# Patient Record
Sex: MALE | Race: BLACK OR AFRICAN AMERICAN | URBAN - METROPOLITAN AREA
[De-identification: names, ages, dates, MRNs, and addresses within clinical notes are randomized per-mention and may not be internally consistent; named-entity substitution may affect disease eponyms.]

---

## 2023-04-03 ENCOUNTER — APPOINTMENT (OUTPATIENT)
Dept: GENERAL RADIOLOGY | Age: 53
End: 2023-04-03
Attending: STUDENT IN AN ORGANIZED HEALTH CARE EDUCATION/TRAINING PROGRAM

## 2023-04-03 ENCOUNTER — HOSPITAL ENCOUNTER (EMERGENCY)
Age: 53
Discharge: LEFT BEFORE DISCHARGE | End: 2023-04-03
Attending: STUDENT IN AN ORGANIZED HEALTH CARE EDUCATION/TRAINING PROGRAM

## 2023-04-03 DIAGNOSIS — F14.90 COCAINE USE: ICD-10-CM

## 2023-04-03 DIAGNOSIS — R07.9 CHEST PAIN, UNSPECIFIED TYPE: Primary | ICD-10-CM

## 2023-04-03 PROCEDURE — 99283 EMERGENCY DEPT VISIT LOW MDM: CPT

## 2023-04-03 PROCEDURE — 71045 X-RAY EXAM CHEST 1 VIEW: CPT

## 2023-04-03 NOTE — ED TRIAGE NOTES
Pt arrives to ED for chest pain after smoking heroin. Describes pain as non-radiating and \" like gas pain\". Pt states clean for 1 year prior to tonight.

## 2023-04-04 NOTE — ED PROVIDER NOTES
60-year-old male with history of cocaine abuse presents to the ED with chief complaint of chest pain starting today. Patient says that for the past month he has relapsed on cocaine after being clean for a year. He smokes, does not snort. Denies any alcohol or other drug use. Denies any associated shortness of breath, fevers, chills, cough, Moises pain, urinary symptoms, bowel symptoms, nausea, vomiting. He does endorse anxiety. History limited as patient cut the interview short and wanted to leave the ED. The history is provided by the patient. Chest Pain (Angina)   Pertinent negatives include no shortness of breath. No past medical history on file. No past surgical history on file. No family history on file. Social History     Socioeconomic History    Marital status: Not on file     Spouse name: Not on file    Number of children: Not on file    Years of education: Not on file    Highest education level: Not on file   Occupational History    Not on file   Tobacco Use    Smoking status: Not on file    Smokeless tobacco: Not on file   Substance and Sexual Activity    Alcohol use: Not on file    Drug use: Not on file    Sexual activity: Not on file   Other Topics Concern    Not on file   Social History Narrative    Not on file     Social Determinants of Health     Financial Resource Strain: Not on file   Food Insecurity: Not on file   Transportation Needs: Not on file   Physical Activity: Not on file   Stress: Not on file   Social Connections: Not on file   Intimate Partner Violence: Not on file   Housing Stability: Not on file         ALLERGIES: Cephalosporins and Keflex [cephalexin]    Review of Systems   Respiratory:  Negative for shortness of breath. Cardiovascular:  Positive for chest pain.      Vitals:    04/03/23 2001   BP: (!) 139/100   Pulse: (!) 101   Resp: 16   Temp: 98.4 °F (36.9 °C)   SpO2: 100%   Weight: 10.9 kg (24 lb)   Height: 6' 1\" (1.854 m)            Physical Exam  Constitutional:       General: He is not in acute distress. Appearance: He is well-developed. HENT:      Head: Normocephalic and atraumatic. Eyes:      Conjunctiva/sclera: Conjunctivae normal.      Pupils: Pupils are equal, round, and reactive to light. Neck:      Trachea: No tracheal deviation. Cardiovascular:      Rate and Rhythm: Normal rate and regular rhythm. Heart sounds: No murmur heard. No friction rub. No gallop. Pulmonary:      Effort: No respiratory distress. Breath sounds: Normal breath sounds. Abdominal:      General: Bowel sounds are normal. There is no distension. Palpations: Abdomen is soft. Tenderness: There is no abdominal tenderness. Musculoskeletal:         General: No deformity. Cervical back: Neck supple. Skin:     General: Skin is warm and dry. Neurological:      Mental Status: He is alert and oriented to person, place, and time. Psychiatric:         Mood and Affect: Mood is anxious. Medical Decision Making  55-year-old male presenting to the ED with chest pain in the setting of cocaine use. Borderline tachycardic, otherwise stable vital signs and benign exam.  Chest x-ray obtained but unfortunately patient left AMA prior to any other testing including EKG and did not receive any paperwork outside of a list of drug abuse treatment programs. Patient did convey understanding that he can return to the ED at any point to continue his work-up and conveyed understanding that he could suffer serious health consequences including disability and death from leaving the hospital without investigating the source of his chest pain. Problems Addressed:  Chest pain, unspecified type: acute illness or injury    Amount and/or Complexity of Data Reviewed  Labs: ordered. Radiology: ordered. ECG/medicine tests: ordered.            Procedures    LABORATORY RESULTS:  No results found for this or any previous visit (from the past 24 hour(s)). IMAGING RESULTS:  XR CHEST PORT    Result Date: 4/3/2023  No Acute Disease. MEDICATIONS GIVEN:  Medications - No data to display    IMPRESSION:  1. Chest pain, unspecified type    2. Cocaine use        PLAN:  Follow-up Information    None       There are no discharge medications for this patient.       Signed By: Radha Byrnes MD     April 3, 2023

## 2024-10-18 ENCOUNTER — HOSPITAL ENCOUNTER (INPATIENT)
Facility: HOSPITAL | Age: 54
LOS: 4 days | Discharge: LEFT BEFORE DISCHARGE | DRG: 751 | End: 2024-10-22
Attending: PSYCHIATRY & NEUROLOGY | Admitting: PSYCHIATRY & NEUROLOGY
Payer: MEDICAID

## 2024-10-18 ENCOUNTER — HOSPITAL ENCOUNTER (EMERGENCY)
Facility: HOSPITAL | Age: 54
Discharge: ANOTHER HOSPITAL | End: 2024-10-18
Attending: EMERGENCY MEDICINE
Payer: MEDICAID

## 2024-10-18 VITALS
RESPIRATION RATE: 16 BRPM | SYSTOLIC BLOOD PRESSURE: 111 MMHG | WEIGHT: 230 LBS | OXYGEN SATURATION: 96 % | TEMPERATURE: 98.4 F | HEIGHT: 73 IN | HEART RATE: 88 BPM | DIASTOLIC BLOOD PRESSURE: 68 MMHG | BODY MASS INDEX: 30.48 KG/M2

## 2024-10-18 DIAGNOSIS — R45.851 SUICIDAL IDEATION: Primary | ICD-10-CM

## 2024-10-18 PROBLEM — F39 UNSPECIFIED MOOD (AFFECTIVE) DISORDER (HCC): Status: ACTIVE | Noted: 2024-10-18

## 2024-10-18 PROBLEM — F33.0 MDD (MAJOR DEPRESSIVE DISORDER), RECURRENT EPISODE, MILD (HCC): Status: ACTIVE | Noted: 2024-10-18

## 2024-10-18 LAB
AMPHET UR QL SCN: NEGATIVE
ANION GAP SERPL CALC-SCNC: 4 MMOL/L (ref 2–12)
APPEARANCE UR: ABNORMAL
BACTERIA URNS QL MICRO: NEGATIVE /HPF
BARBITURATES UR QL SCN: NEGATIVE
BASOPHILS # BLD: 0 K/UL (ref 0–0.1)
BASOPHILS NFR BLD: 1 % (ref 0–1)
BENZODIAZ UR QL: NEGATIVE
BILIRUB UR QL: NEGATIVE
BUN SERPL-MCNC: 27 MG/DL (ref 6–20)
BUN/CREAT SERPL: 14 (ref 12–20)
CA-I BLD-MCNC: 9.3 MG/DL (ref 8.5–10.1)
CANNABINOIDS UR QL SCN: POSITIVE
CHLORIDE SERPL-SCNC: 108 MMOL/L (ref 97–108)
CO2 SERPL-SCNC: 30 MMOL/L (ref 21–32)
COCAINE UR QL SCN: POSITIVE
COLOR UR: YELLOW
CREAT SERPL-MCNC: 1.9 MG/DL (ref 0.7–1.3)
DIFFERENTIAL METHOD BLD: NORMAL
EKG ATRIAL RATE: 92 BPM
EKG DIAGNOSIS: NORMAL
EKG P AXIS: 54 DEGREES
EKG P-R INTERVAL: 152 MS
EKG Q-T INTERVAL: 362 MS
EKG QRS DURATION: 88 MS
EKG QTC CALCULATION (BAZETT): 447 MS
EKG R AXIS: 67 DEGREES
EKG T AXIS: -16 DEGREES
EKG VENTRICULAR RATE: 92 BPM
EOSINOPHIL # BLD: 0.1 K/UL (ref 0–0.4)
EOSINOPHIL NFR BLD: 1 % (ref 0–7)
EPITH CASTS URNS QL MICRO: ABNORMAL /LPF
ERYTHROCYTE [DISTWIDTH] IN BLOOD BY AUTOMATED COUNT: 14 % (ref 11.5–14.5)
ETHANOL SERPL-MCNC: <10 MG/DL (ref 0–0.08)
GLUCOSE SERPL-MCNC: 112 MG/DL (ref 65–100)
GLUCOSE UR STRIP.AUTO-MCNC: 50 MG/DL
HCT VFR BLD AUTO: 44.9 % (ref 36.6–50.3)
HGB BLD-MCNC: 14.6 G/DL (ref 12.1–17)
HGB UR QL STRIP: NEGATIVE
IMM GRANULOCYTES # BLD AUTO: 0 K/UL (ref 0–0.04)
IMM GRANULOCYTES NFR BLD AUTO: 0 % (ref 0–0.5)
KETONES UR QL STRIP.AUTO: 5 MG/DL
LEUKOCYTE ESTERASE UR QL STRIP.AUTO: NEGATIVE
LYMPHOCYTES # BLD: 2 K/UL (ref 0.8–3.5)
LYMPHOCYTES NFR BLD: 32 % (ref 12–49)
Lab: ABNORMAL
MCH RBC QN AUTO: 28.8 PG (ref 26–34)
MCHC RBC AUTO-ENTMCNC: 32.5 G/DL (ref 30–36.5)
MCV RBC AUTO: 88.6 FL (ref 80–99)
METHADONE UR QL: NEGATIVE
MONOCYTES # BLD: 0.5 K/UL (ref 0–1)
MONOCYTES NFR BLD: 9 % (ref 5–13)
MUCOUS THREADS URNS QL MICRO: ABNORMAL /LPF
NEUTS SEG # BLD: 3.6 K/UL (ref 1.8–8)
NEUTS SEG NFR BLD: 57 % (ref 32–75)
NITRITE UR QL STRIP.AUTO: NEGATIVE
NRBC # BLD: 0 K/UL (ref 0–0.01)
NRBC BLD-RTO: 0 PER 100 WBC
OPIATES UR QL: NEGATIVE
PCP UR QL: NEGATIVE
PH UR STRIP: 5 (ref 5–8)
PLATELET # BLD AUTO: 301 K/UL (ref 150–400)
PMV BLD AUTO: 9.7 FL (ref 8.9–12.9)
POTASSIUM SERPL-SCNC: 4.1 MMOL/L (ref 3.5–5.1)
PROT UR STRIP-MCNC: 30 MG/DL
RBC # BLD AUTO: 5.07 M/UL (ref 4.1–5.7)
RBC #/AREA URNS HPF: ABNORMAL /HPF (ref 0–5)
SODIUM SERPL-SCNC: 142 MMOL/L (ref 136–145)
SP GR UR REFRACTOMETRY: 1.03 (ref 1–1.03)
URINE CULTURE IF INDICATED: ABNORMAL
UROBILINOGEN UR QL STRIP.AUTO: 0.1 EU/DL (ref 0.1–1)
WBC # BLD AUTO: 6.2 K/UL (ref 4.1–11.1)
WBC URNS QL MICRO: ABNORMAL /HPF (ref 0–4)

## 2024-10-18 PROCEDURE — 99285 EMERGENCY DEPT VISIT HI MDM: CPT

## 2024-10-18 PROCEDURE — 85025 COMPLETE CBC W/AUTO DIFF WBC: CPT

## 2024-10-18 PROCEDURE — 80048 BASIC METABOLIC PNL TOTAL CA: CPT

## 2024-10-18 PROCEDURE — 82077 ASSAY SPEC XCP UR&BREATH IA: CPT

## 2024-10-18 PROCEDURE — 80307 DRUG TEST PRSMV CHEM ANLYZR: CPT

## 2024-10-18 PROCEDURE — 1240000000 HC EMOTIONAL WELLNESS R&B

## 2024-10-18 PROCEDURE — 81001 URINALYSIS AUTO W/SCOPE: CPT

## 2024-10-18 PROCEDURE — 93005 ELECTROCARDIOGRAM TRACING: CPT | Performed by: EMERGENCY MEDICINE

## 2024-10-18 RX ORDER — POLYETHYLENE GLYCOL 3350 17 G/17G
17 POWDER, FOR SOLUTION ORAL DAILY PRN
Status: DISCONTINUED | OUTPATIENT
Start: 2024-10-18 | End: 2024-10-22 | Stop reason: HOSPADM

## 2024-10-18 RX ORDER — ACETAMINOPHEN 325 MG/1
650 TABLET ORAL EVERY 4 HOURS PRN
Status: DISCONTINUED | OUTPATIENT
Start: 2024-10-18 | End: 2024-10-22 | Stop reason: HOSPADM

## 2024-10-18 RX ORDER — HYDROXYZINE HYDROCHLORIDE 50 MG/1
50 TABLET, FILM COATED ORAL 3 TIMES DAILY PRN
Status: DISCONTINUED | OUTPATIENT
Start: 2024-10-18 | End: 2024-10-22 | Stop reason: HOSPADM

## 2024-10-18 RX ORDER — DIPHENHYDRAMINE HYDROCHLORIDE 50 MG/ML
50 INJECTION INTRAMUSCULAR; INTRAVENOUS EVERY 4 HOURS PRN
Status: DISCONTINUED | OUTPATIENT
Start: 2024-10-18 | End: 2024-10-22 | Stop reason: HOSPADM

## 2024-10-18 RX ORDER — HALOPERIDOL 5 MG/1
5 TABLET ORAL EVERY 4 HOURS PRN
Status: DISCONTINUED | OUTPATIENT
Start: 2024-10-18 | End: 2024-10-22 | Stop reason: HOSPADM

## 2024-10-18 RX ORDER — TRAZODONE HYDROCHLORIDE 50 MG/1
50 TABLET, FILM COATED ORAL NIGHTLY PRN
Status: DISCONTINUED | OUTPATIENT
Start: 2024-10-18 | End: 2024-10-22 | Stop reason: HOSPADM

## 2024-10-18 RX ORDER — HALOPERIDOL 5 MG/ML
5 INJECTION INTRAMUSCULAR EVERY 4 HOURS PRN
Status: DISCONTINUED | OUTPATIENT
Start: 2024-10-18 | End: 2024-10-22 | Stop reason: HOSPADM

## 2024-10-18 ASSESSMENT — SLEEP AND FATIGUE QUESTIONNAIRES
AVERAGE NUMBER OF SLEEP HOURS: 6
DO YOU USE A SLEEP AID: NO
DO YOU HAVE DIFFICULTY SLEEPING: NO

## 2024-10-18 ASSESSMENT — LIFESTYLE VARIABLES
HOW MANY STANDARD DRINKS CONTAINING ALCOHOL DO YOU HAVE ON A TYPICAL DAY: PATIENT DOES NOT DRINK
HOW MANY STANDARD DRINKS CONTAINING ALCOHOL DO YOU HAVE ON A TYPICAL DAY: 1 OR 2
HOW OFTEN DO YOU HAVE A DRINK CONTAINING ALCOHOL: NEVER
HOW OFTEN DO YOU HAVE A DRINK CONTAINING ALCOHOL: 2-4 TIMES A MONTH

## 2024-10-18 ASSESSMENT — PAIN - FUNCTIONAL ASSESSMENT: PAIN_FUNCTIONAL_ASSESSMENT: NONE - DENIES PAIN

## 2024-10-18 NOTE — BH NOTE
Per Dr. Shafer, pt [facility] and does not require 1:1 on the BHU at this time.

## 2024-10-18 NOTE — BSMART NOTE
Comprehensive Assessment Form Part 1      Section I - Disposition    Primary Diagnosis: SI  Secondary Diagnosis:     The Medical Doctor to Psychiatrist conference was notcompleted.  The Medical Doctor is in agreement with intake disposition  The plan is voluntary admission pending medical clearance .  The on-call Psychiatrist consulted was Dr. GABRIEL.  The admitting Psychiatrist will be Dr. HOLT.  The admitting Diagnosis is unspecified mood disorder.  The Payor source is Medicaid.     BSMART assessment completed, and suicide risk level noted to be High. Primary Nurse Khoa HSU and Charge Nurse AMADOR/SAURABH and Physician Marv BAILEY notified. Concerns not observed.     This writer reviewed the [place] Suicide Severity Rating Scale in nursing flowsheet and the risk level assigned is High risk.  Based on this assessment, the risk of suicide is High risk and the plan is inpatient admission pending medical clearance.    Section II - Integrated Summary  Summary:      This writer met with pt face to face in ED room 24 with 1:1 sitter noted outside of the room. Pt appeared disheveled and was wearing a green gown. Pt was not accompanied by anyone. Pt presented in an irritable mood with an incongruent affect. Pt did not appear to be responding to internal stimuli. Pt presented with softer speech. Pt was alert and oriented to all spheres. Pt endorsed SI with a plan to shoot himself in the head. Pt denied AVH and HI.    Pt endorses that he came to the ED because he does not want to feel depressed and suicidal. Pt states he walked from [facility] and has been walking, without eating or drinking for the past 3 days.  Pt states he has been depressed since last month. He states he has a plan to shoot himself with a 45 caliber. Pt states [place] access [place] When this writer asked about symptoms the pt stated \" I have schizophrenia, PTSD, anxiety, depression and bipolar\", when this writer asked more

## 2024-10-18 NOTE — ED TRIAGE NOTES
EMS reports pt verbalized thoughts of hurting himself not others to EMS with reported history of suicide attempts.     Pt reports he is schizophrenic and walked from [place] to [place] 2 days ago. Pt advised he has been off his meds. Pt confirms SI.

## 2024-10-18 NOTE — CARE COORDINATION
10/18/24 1542   ITP   Date of Plan 10/18/24   Date of Next Review 10/25/24   Primary Diagnosis Code Unspecified mood   Barriers to Treatment Need for psychoeducation;Psychiatric symptom (comment)   Strengths Incorporated in Plan Acknowledging need for assistance;Community supports;Family supports;Natural supports;Postive outlook;Seeking interactions   Plan of Care   Long Term Goal (LTG) Stated in patient/guardian terms On PSA   Short Term Goal 1   Short Term Goal 1 Client will learn and demonstrate improved self management skills   Baseline Functioning Unknown   Target TBD   Objectives Client will participate in individual therapy;Client will participate in group therapy   Intervention 1 Acknowledge client strengths   Frequency Daily   Measured by Behavioral data;Self report;Staff observation   Staff Responsible [facility] staff;Clinical staff   Intervention 2 Referral to community services   Frequency Weekly   Measured by Behavioral data;Self report;Staff observation   Staff Responsible [facility] staff;Clinical staff   Intervention 3 Group therapy   Frequency Daily   Measured by Self report;Staff observation;Behavioral data   Staff Responsible [facility] staff;Clinical staff   STG Goal 1 Status: Patient Appears to be  Treatment plan goal is unmet at this time   Short Term Goal 2   Short Term Goal 2 Client will maintain compliance with medication regime   Baseline Functioning Unknown   Target TBD   Objectives Client will participate in individual therapy;Client will participate in group therapy   Intervention 1 Referral to community services   Frequency Weekly   Measured by Behavioral data;Self report;Staff observation   Staff Responsible [facility] staff;Clinical staff   Intervention 2 Monitor medications   Frequency Daily   Measured by Behavioral data;Self report;Staff observation   Staff Responsible [facility] staff   STG Goal 2 Status: Patient Appears to be  Treatment plan goal is unmet at this time   Crisis/Safety/Discharge Plan

## 2024-10-18 NOTE — ED NOTES
Pt has good appetite and is drinking plenty of fluids. Pt asked for a second breakfast tray stating he did not eat or drink much the prior three days to admission and was physically exerting himself. Pt states he knew his kidney function did this in the past with similar behavior, but self-corrected with adequate oral intake.

## 2024-10-18 NOTE — ED PROVIDER NOTES
[facility] EMERGENCY DEPT  EMERGENCY DEPARTMENT HISTORY AND PHYSICAL EXAM      Date: 10/18/2024  Patient Name: Gaurav Garcia  MRN: [id number]  Birthdate 1970  Date of evaluation: 10/18/2024  Provider: Kp Fair MD   Note Started: 2:09 AM EDT 10/18/24    HISTORY OF PRESENT ILLNESS     Chief Complaint   Patient presents with    Suicidal       History Provided By: Patient and EMS    HPI: Gaurav Garcia is a 54 y.o. male presents for evaluation of suicidal ideation.  Patient also reports multiple stressors such that he walked here from [place] over the last 3 days.  Patient states he has been off of his medications.  Patient denies any somatic complaints at present.    PAST MEDICAL HISTORY   Past Medical History:  Past Medical History:   Diagnosis Date    Schizo affective schizophrenia (HCC)        Past Surgical History:  History reviewed. No pertinent surgical history.    Family History:  History reviewed. No pertinent family history.    Social History:  Social History     Tobacco Use    Smoking status: Never    Smokeless tobacco: Never   Substance Use Topics    Alcohol use: Never    Drug use: Never       Allergies:  Allergies   Allergen Reactions    Keflex [Cephalexin]        PCP: No primary care provider on file.    Current Meds:   No current facility-administered medications for this encounter.     No current outpatient medications on file.       Social Determinants of Health:   Social Determinants of Health     Tobacco Use: Low Risk  (10/18/2024)    Patient History     Smoking Tobacco Use: Never     Smokeless Tobacco Use: Never     Passive Exposure: Not on file   Alcohol Use: Not At Risk (10/18/2024)    AUDIT-C     Frequency of Alcohol Consumption: Never     Average Number of Drinks: Patient does not drink     Frequency of Binge Drinking: Never   Financial Resource Strain: Not on file   Food Insecurity: Not on file   Transportation Needs: Not on file   Physical Activity: Not on file   Stress: Not on file    Fri Oct 18, 2024   0512 EKG indicates normal sinus rhythm with ventricular of 92 bpm.  T wave abnormality.  No acute EKG findings. [CS]   0514 The patient noted to have mildly increased creatinine.  No emergent findings at this time, would recommend follow-up in 1 to 2 weeks with primary care or nephrology.  Patient is medically cleared at this time [CS]      ED Course User Index  [CS] Kp Fair MD       SEPSIS Reassessment: Sepsis reassessment not applicable    Clinical Management Tools:  Not Applicable    Patient was given the following medications:  Medications - No data to display    CONSULTS: See ED Course/MDM for further details.  IP CONSULT TO BSMART     Social Determinants affecting Diagnosis/Treatment: None    Smoking Cessation: Not Applicable    PROCEDURES   Unless otherwise noted above, none  Procedures      CRITICAL CARE TIME   Patient does not meet Critical Care Time, 0 minutes    ED IMPRESSION     1. Suicidal ideation          DISPOSITION/PLAN   DISPOSITION    Condition at Disposition: Data Unavailable    Transfer: The patient is being transferred to [facility] psychiatry. The results of their tests and reasons for their transfer have been discussed with the patient and/or available family. The patient/family has conveyed agreement and understanding for the need to be transferred and for their diagnosis. Consultation has been made with Dr. Shafer, who agrees to accept the transfer.          I am the Primary Clinician of Record. Kp Fair MD (electronically signed)    (Please note that parts of this dictation were completed with voice recognition software. Quite often unanticipated grammatical, syntax, homophones, and other interpretive errors are inadvertently transcribed by the computer software. Please disregards these errors. Please excuse any errors that have escaped final proofreading.)     Kp Fair MD  10/18/24 0630

## 2024-10-18 NOTE — BSMART NOTE
Per Access, Pt accepted to [facility] by Dr. Shafer to bed 103-01. Nurse report can be called to [phone]

## 2024-10-19 PROBLEM — F14.20 COCAINE USE DISORDER, MODERATE, DEPENDENCE (HCC): Status: ACTIVE | Noted: 2024-10-19

## 2024-10-19 PROBLEM — F19.94 SUBSTANCE INDUCED MOOD DISORDER (HCC): Status: ACTIVE | Noted: 2024-10-19

## 2024-10-19 PROBLEM — F33.9 MAJOR DEPRESSIVE DISORDER, RECURRENT EPISODE (HCC): Status: ACTIVE | Noted: 2024-10-18

## 2024-10-19 PROBLEM — F33.0 MDD (MAJOR DEPRESSIVE DISORDER), RECURRENT EPISODE, MILD (HCC): Status: RESOLVED | Noted: 2024-10-18 | Resolved: 2024-10-19

## 2024-10-19 PROBLEM — F39 UNSPECIFIED MOOD (AFFECTIVE) DISORDER (HCC): Status: RESOLVED | Noted: 2024-10-18 | Resolved: 2024-10-19

## 2024-10-19 PROCEDURE — 1240000000 HC EMOTIONAL WELLNESS R&B

## 2024-10-19 NOTE — GROUP NOTE
Group Therapy Note    Date: 10/19/2024    Group Start Time: 0900  Group End Time: 0945  Group Topic: Community Meeting    [facility]    Liya Basilio LPN        Group Therapy Note    Attendees: 5/5       Patient's Goal:  to get more food    Notes:  Community Group:   Pt. Filled out paper and did not speak about any goals or issues on the paper.    Status After Intervention:  Unchanged    Participation Level: Minimal    Participation Quality: Appropriate      Speech:  normal      Thought Process/Content: Logical      Affective Functioning: Congruent      Mood: euthymic      Level of consciousness:  Alert and Oriented x4      Response to Learning: Able to retain information, Capable of insight, and Progressing to goal      Endings: None Reported    Modes of Intervention: Education      Discipline Responsible: Licensed Practical Nurse      Signature:  Liya Basilio LPN

## 2024-10-19 NOTE — PLAN OF CARE
Problem: Self Harm/Suicidality  Goal: Will have no self-injury during hospital stay  Description: INTERVENTIONS:  1.  Ensure constant observer at bedside with Q15M safety checks  2.  Maintain a safe environment  3.  Secure patient belongings  4.  Ensure family/visitors adhere to safety recommendations  5.  Ensure safety tray has been added to patient's diet order  6.  Every shift and PRN: Re-assess suicidal risk via Frequent Screener    Outcome: Progressing     Problem: Depression  Goal: Will be euthymic at discharge  Description: INTERVENTIONS:  1. Administer medication as ordered  2. Provide emotional support via 1:1 interaction with staff  3. Encourage involvement in milieu/groups/activities  4. Monitor for social isolation  Outcome: Progressing     Problem: Patricia  Goal: Will exhibit normal sleep and speech and no impulsivity  Description: INTERVENTIONS:  1. Administer medication as ordered  2. Set limits on impulsive behavior  3. Make attempts to decrease external stimuli as possible  Outcome: Progressing     Problem: Drug Abuse/Detox  Goal: Will have no detox symptoms and will verbalize plan for changing drug-related behavior  Description: INTERVENTIONS:  1. Administer medication as ordered  2. Monitor physical status  3. Provide emotional support with 1:1 interaction with staff  4. Encourage  recovery focused treatment   Outcome: Progressing     Problem: Safety - Adult  Goal: Free from fall injury  Outcome: Progressing

## 2024-10-19 NOTE — CONSULTS
Hospitalist Consult Note             Chief Complaints:   No chief complaint on file.  Hypertension, JOYCE, cocaine use    Subjective:     Gaurav Garcia is a 54 y.o. male followed by Blanca Jaffe DO and  has a past medical history of Schizo affective schizophrenia (HCC).    Patient is currently admitted to the psychiatric unit.  History appears to include schizophrenia, HTN, cocaine use.  We are consulted for medical evaluation.    I have reviewed the ER physician and nursing notes, as well as some clinical notes from prior ER visits.  Patient presented to outside ER via EMS with suicidal ideation.  Reported he is schizophrenic and has been off of his medications.  Patient's vital signs were normal in the ER.  EKG obtained in the ER showed normal sinus rhythm normal intervals.  Labs showed creatinine 1.90, electrolytes normal, CBC completely normal, urinalysis dry with some protein and ketones, UDS positive cocaine and THC, negative alcohol level.  In reviewing outside medications I see the patient has been prescribed amlodipine, Cogentin, Risperdal, sertraline, trazodone.  He has also been prescribed Valtrex, sildenafil, nicotine patch, clindamycin recently.    I see that patient has been eating and drinking well since arrival to the psychiatric unit.  I evaluated the pt around 330pm in the psych unit in his room. I observed he was ambulating well. He told me he is having a bad day bc \"this place uses food like a carrot\" and says it's like correction here.  When asked if he ate breakfast and lunch he said yes but he was still hungry.  I asked about Valtrex rx and he stated he only takes that when he has an outbreak of HSV.  He denied abd pain, nausea, CP, SOB. He did not want to talk and laid down in his bed and pulled his blanket over his head.     Spoke to nursing staff, pt has double portions ordered already.         Past Medical History:   Diagnosis Date    Schizo affective schizophrenia (HCC)

## 2024-10-20 LAB
ANION GAP SERPL CALC-SCNC: 5 MMOL/L (ref 2–12)
BUN SERPL-MCNC: 15 MG/DL (ref 6–20)
BUN/CREAT SERPL: 14 (ref 12–20)
CA-I BLD-MCNC: 8.7 MG/DL (ref 8.5–10.1)
CHLORIDE SERPL-SCNC: 104 MMOL/L (ref 97–108)
CHOLEST SERPL-MCNC: 170 MG/DL
CO2 SERPL-SCNC: 31 MMOL/L (ref 21–32)
CREAT SERPL-MCNC: 1.09 MG/DL (ref 0.7–1.3)
EST. AVERAGE GLUCOSE BLD GHB EST-MCNC: 126 MG/DL
GLUCOSE SERPL-MCNC: 94 MG/DL (ref 65–100)
HBA1C MFR BLD: 6 % (ref 4–5.6)
HDLC SERPL-MCNC: 48 MG/DL
HDLC SERPL: 3.5 (ref 0–5)
LDLC SERPL CALC-MCNC: 100.2 MG/DL (ref 0–100)
LIPID PANEL: ABNORMAL
POTASSIUM SERPL-SCNC: 4.2 MMOL/L (ref 3.5–5.1)
SODIUM SERPL-SCNC: 140 MMOL/L (ref 136–145)
TRIGL SERPL-MCNC: 109 MG/DL
VLDLC SERPL CALC-MCNC: 21.8 MG/DL

## 2024-10-20 PROCEDURE — 80048 BASIC METABOLIC PNL TOTAL CA: CPT

## 2024-10-20 PROCEDURE — 80061 LIPID PANEL: CPT

## 2024-10-20 PROCEDURE — 1240000000 HC EMOTIONAL WELLNESS R&B

## 2024-10-20 PROCEDURE — 83036 HEMOGLOBIN GLYCOSYLATED A1C: CPT

## 2024-10-20 NOTE — PROGRESS NOTES
Psychiatric Progress Note      Patient: Gaurav Garcia MRN: [id number]  SSN: [id number]    YOB: 1970  Age: 54 y.o.  Sex: male      Admit Date: 10/18/2024       Subjective:     Gaurav Garcia stated he is feeling anxious about not getting snacks when he requested.  Patient is mostly obsessed and preoccupied about the rules and regulations on the unit.  Stated he wanted to go to [facility].  Denied any suicidal ideations.  Not interested in going to rehab services and would like to follow up with [facility] after discharge.  Reported good sleep and appetite.  Not interested in any antidepressant medications.    Objective:     Vitals:    10/18/24 1828 10/18/24 1900 10/20/24 0607   BP:  106/69 97/63   Pulse:  77 72   Resp:   16   Temp:   98 °F (36.7 °C)   TempSrc:   Temporal   SpO2:   96%   Weight: 97.1 kg (214 lb)     Height: 1.854 m (6' 1\")          Mental Status Exam:     Appearance-fairly groomed  Alert, oriented x4  Speech -normal rate, volume and rhythm  Mood-irritable  Affect-congruent  Thought process-linear and goal directed  Thought content-no delusions   Thought perception-no auditory or visual hallucinations   No suicidal ideations   Insight fair  Judgement Limited    MEDICATIONS:  Current Facility-Administered Medications   Medication Dose Route Frequency    acetaminophen (TYLENOL) tablet 650 mg  650 mg Oral Q4H PRN    polyethylene glycol (GLYCOLAX) packet 17 g  17 g Oral Daily PRN    hydrOXYzine HCl (ATARAX) tablet 50 mg  50 mg Oral TID PRN    haloperidol (HALDOL) tablet 5 mg  5 mg Oral Q4H PRN    Or    haloperidol lactate (HALDOL) injection 5 mg  5 mg IntraMUSCular Q4H PRN    diphenhydrAMINE (BENADRYL) injection 50 mg  50 mg IntraMUSCular Q4H PRN    traZODone (DESYREL) tablet 50 mg  50 mg Oral Nightly PRN        DISCUSSION:  Discussed risk and benefits of medication, provided an opportunity to answer any questions, reviewed discharge goals.    Lab/Data Review:  Recent

## 2024-10-20 NOTE — H&P
10/18/2024 Negative  Negative   Final    Phencyclidine, Urine 10/18/2024 Negative  Negative   Final    THC, TH-Cannabinol, Urine 10/18/2024 Positive (A)  Negative   Final    Comments: 10/18/2024     Final                    Value:This test is a screen for drugs of abuse in a medical setting only (i.e., they are unconfirmed results and as such must not be used for non-medical purposes, e.g.,employment testing, legal testing). Due to its inherent nature, false positive (FP) and false negative (FN) results may be obtained. Therefore, if necessary for medical care, recommend confirmation of positive findings by GC/MS.      Sodium 10/18/2024 142  136 - 145 mmol/L Final    Potassium 10/18/2024 4.1  3.5 - 5.1 mmol/L Final    Chloride 10/18/2024 108  97 - 108 mmol/L Final    CO2 10/18/2024 30  21 - 32 mmol/L Final    Anion Gap 10/18/2024 4  2 - 12 mmol/L Final    Glucose 10/18/2024 112 (H)  65 - 100 mg/dL Final    BUN 10/18/2024 27 (H)  6 - 20 mg/dL Final    Creatinine 10/18/2024 1.90 (H)  0.70 - 1.30 mg/dL Final    BUN/Creatinine Ratio 10/18/2024 14  12 - 20   Final    Est, Glom Filt Rate 10/18/2024 42 (L)  >60 ml/min/1.73m2 Final    Calcium 10/18/2024 9.3  8.5 - 10.1 mg/dL Final    WBC 10/18/2024 6.2  4.1 - 11.1 K/uL Final    RBC 10/18/2024 5.07  4.10 - 5.70 M/uL Final    Hemoglobin 10/18/2024 14.6  12.1 - 17.0 g/dL Final    Hematocrit 10/18/2024 44.9  36.6 - 50.3 % Final    MCV 10/18/2024 88.6  80.0 - 99.0 FL Final    MCH 10/18/2024 28.8  26.0 - 34.0 PG Final    MCHC 10/18/2024 32.5  30.0 - 36.5 g/dL Final    RDW 10/18/2024 14.0  11.5 - 14.5 % Final    Platelets 10/18/2024 301  150 - 400 K/uL Final    MPV 10/18/2024 9.7  8.9 - 12.9 FL Final    Nucleated RBCs 10/18/2024 0.0  0.0  WBC Final    nRBC 10/18/2024 0.00  0.00 - 0.01 K/uL Final    Neutrophils % 10/18/2024 57  32 - 75 % Final    Lymphocytes % 10/18/2024 32  12 - 49 % Final    Monocytes % 10/18/2024 9  5 - 13 % Final    Eosinophils % 10/18/2024 1  0 - 7 %

## 2024-10-20 NOTE — PLAN OF CARE
Problem: Discharge Planning  Goal: Discharge to home or other facility with appropriate resources  Outcome: Progressing     Problem: Self Harm/Suicidality  Goal: Will have no self-injury during hospital stay  Description: INTERVENTIONS:  1.  Ensure constant observer at bedside with Q15M safety checks  2.  Maintain a safe environment  3.  Secure patient belongings  4.  Ensure family/visitors adhere to safety recommendations  5.  Ensure safety tray has been added to patient's diet order  6.  Every shift and PRN: Re-assess suicidal risk via Frequent Screener    10/19/2024 2328 by Liliana Zepeda RN  Outcome: Progressing  10/19/2024 1823 by Yg Lee RN  Outcome: Progressing     Problem: Depression  Goal: Will be euthymic at discharge  Description: INTERVENTIONS:  1. Administer medication as ordered  2. Provide emotional support via 1:1 interaction with staff  3. Encourage involvement in milieu/groups/activities  4. Monitor for social isolation  10/19/2024 2328 by Liliana Zepeda RN  Outcome: Progressing  10/19/2024 1823 by Yg Lee RN  Outcome: Progressing     Problem: Patricia  Goal: Will exhibit normal sleep and speech and no impulsivity  Description: INTERVENTIONS:  1. Administer medication as ordered  2. Set limits on impulsive behavior  3. Make attempts to decrease external stimuli as possible  10/19/2024 2328 by Liliana Zepeda RN  Outcome: Progressing  10/19/2024 1823 by Yg Lee RN  Outcome: Progressing     Problem: Drug Abuse/Detox  Goal: Will have no detox symptoms and will verbalize plan for changing drug-related behavior  Description: INTERVENTIONS:  1. Administer medication as ordered  2. Monitor physical status  3. Provide emotional support with 1:1 interaction with staff  4. Encourage  recovery focused treatment   10/19/2024 2328 by Liliana Zepeda RN  Outcome: Progressing  10/19/2024 1823 by Yg Lee RN  Outcome: Progressing     Problem:

## 2024-10-21 VITALS
DIASTOLIC BLOOD PRESSURE: 84 MMHG | HEIGHT: 73 IN | TEMPERATURE: 97.7 F | BODY MASS INDEX: 28.36 KG/M2 | RESPIRATION RATE: 16 BRPM | WEIGHT: 214 LBS | HEART RATE: 79 BPM | OXYGEN SATURATION: 97 % | SYSTOLIC BLOOD PRESSURE: 134 MMHG

## 2024-10-21 PROCEDURE — 1240000000 HC EMOTIONAL WELLNESS R&B

## 2024-10-21 NOTE — GROUP NOTE
Group Therapy Note    Date: 10/21/2024    Group Start Time: 1415  Group End Time: 1500  Group Topic: Psychoeducation    [facility]    Winter Silver        Group Therapy Note    Attendees: 2/5    Writer facilitated an inpatient psych-ed group. Writer provided a handout regarding the CBT concept of core beliefs. Therapeutic purpose of the activity was for patients to explore ways negative core beliefs impact mental health. Writer held the space as patients processed. Writer concluded session with a grounding exercise and encouraging patients to provide input and feedback regarding the group.        Pt invited and encouraged to attend group but chose not to attend.       Signature:  Winter Silver

## 2024-10-21 NOTE — PROGRESS NOTES
Psychiatric Progress Note      Patient: Gaurav Garcia MRN: [id number]  SSN: [id number]    YOB: 1970  Age: 54 y.o.  Sex: male      Admit Date: 10/18/2024       Subjective:     Gaurav Garcia stated he is feeling better with his mood.  Patient wanted to go to [facility] in [facility].  Not interested in any antidepressant medications.  Denied any suicidal or homicidal ideations.  Reported good sleep and appetite.    Objective:     Vitals:    10/18/24 1828 10/18/24 1900 10/20/24 0607 10/20/24 1607   BP:  106/69 97/63 134/84   Pulse:  77 72 79   Resp:   16 16   Temp:   98 °F (36.7 °C) 97.7 °F (36.5 °C)   TempSrc:   Temporal Temporal   SpO2:   96% 97%   Weight: 97.1 kg (214 lb)      Height: 1.854 m (6' 1\")           Mental Status Exam:     Appearance-fairly groomed  Alert, oriented x4  Speech -normal rate, volume and rhythm  Mood-\"fine\"  Affect-congruent  Thought process-linear and goal directed  Thought content-no delusions   Thought perception-no auditory or visual hallucinations   No suicidal ideations   Insight fair  Judgement Limited    MEDICATIONS:  Current Facility-Administered Medications   Medication Dose Route Frequency    acetaminophen (TYLENOL) tablet 650 mg  650 mg Oral Q4H PRN    polyethylene glycol (GLYCOLAX) packet 17 g  17 g Oral Daily PRN    hydrOXYzine HCl (ATARAX) tablet 50 mg  50 mg Oral TID PRN    haloperidol (HALDOL) tablet 5 mg  5 mg Oral Q4H PRN    Or    haloperidol lactate (HALDOL) injection 5 mg  5 mg IntraMUSCular Q4H PRN    diphenhydrAMINE (BENADRYL) injection 50 mg  50 mg IntraMUSCular Q4H PRN    traZODone (DESYREL) tablet 50 mg  50 mg Oral Nightly PRN        DISCUSSION:  Discussed risk and benefits of medication, provided an opportunity to answer any questions, reviewed discharge goals.    Lab/Data Review:  No results found for this or any previous visit (from the past 24 hour(s)).          Assessment:     Principal Problem (Resolved):    Unspecified mood (affective)

## 2024-10-21 NOTE — GROUP NOTE
Group Therapy Note    Date: 10/21/2024    Group Start Time: 1330  Group End Time: 1415  Group Topic: Process Group - Inpatient    [facility]    Winter Silver        Group Therapy Note    Attendees: 2/5    Writer facilitated an inpatient processing group. Writer had patients engage in a reflective exercise in which they were asked to engage in an expressive arts activity involving reflection on Past, Present, Future. Writer encouraged patients to discuss feelings, discharge plans, etc. Writer held the space as patients processed. Writer concluded session with a grounding exercise and encouraging patients to provide input and feedback regarding the group.        Pt invited and encouraged to attend group but chose not to attend.     Signature:  Winter Silver

## 2024-10-21 NOTE — PLAN OF CARE
Problem: Discharge Planning  Goal: Discharge to home or other facility with appropriate resources  Outcome: Progressing     Problem: Self Harm/Suicidality  Goal: Will have no self-injury during hospital stay  Description: INTERVENTIONS:  1.  Ensure constant observer at bedside with Q15M safety checks  2.  Maintain a safe environment  3.  Secure patient belongings  4.  Ensure family/visitors adhere to safety recommendations  5.  Ensure safety tray has been added to patient's diet order  6.  Every shift and PRN: Re-assess suicidal risk via Frequent Screener    10/20/2024 2248 by Liliana Zepeda RN  Outcome: Progressing  10/20/2024 1431 by Yg Lee RN  Outcome: Progressing     Problem: Depression  Goal: Will be euthymic at discharge  Description: INTERVENTIONS:  1. Administer medication as ordered  2. Provide emotional support via 1:1 interaction with staff  3. Encourage involvement in milieu/groups/activities  4. Monitor for social isolation  10/20/2024 2248 by Liliana Zepeda RN  Outcome: Progressing  10/20/2024 1431 by Yg Lee RN  Outcome: Progressing     Problem: Patricia  Goal: Will exhibit normal sleep and speech and no impulsivity  Description: INTERVENTIONS:  1. Administer medication as ordered  2. Set limits on impulsive behavior  3. Make attempts to decrease external stimuli as possible  10/20/2024 2248 by Liliana Zepeda RN  Outcome: Progressing  10/20/2024 1431 by Yg Lee RN  Outcome: Progressing     Problem: Drug Abuse/Detox  Goal: Will have no detox symptoms and will verbalize plan for changing drug-related behavior  Description: INTERVENTIONS:  1. Administer medication as ordered  2. Monitor physical status  3. Provide emotional support with 1:1 interaction with staff  4. Encourage  recovery focused treatment   10/20/2024 2248 by Liliana Zepeda RN  Outcome: Progressing  10/20/2024 1431 by Yg Lee RN  Outcome: Progressing     Problem:

## 2024-10-22 NOTE — GROUP NOTE
Group Therapy Note    Date: 10/21/2024    Group Start Time: 0800  Group End Time: 0845  Group Topic: Wrap-Up    [facility]    Nancy Romo        Group Therapy Note    Attendees: 3       Patient's Goal:  To maintain sobriety    Notes:  N/A    Status After Intervention:  Improved    Participation Level: Active Listener    Participation Quality: Appropriate      Speech:  normal      Thought Process/Content: Logical      Affective Functioning: Congruent      Mood: anxious      Level of consciousness:  Alert      Response to Learning: Able to verbalize current knowledge/experience      Endings: None Reported    Modes of Intervention: Support      Discipline Responsible: Behavorial Health Tech      Signature:  Nancy Romo

## 2024-10-22 NOTE — PROGRESS NOTES
Pt rested well tonight with no complaints voiced and no problems noted on rounds. Safe on unit will continue to monitor.

## 2024-10-22 NOTE — DISCHARGE SUMMARY
Esterase, Urine 10/18/2024 Negative  Negative   Final    BACTERIA, URINE 10/18/2024 Negative  Negative /hpf Final    Urine Culture if Indicated 10/18/2024 Culture not indicated by UA result  Culture not indicated by UA result   Final    WBC, UA 10/18/2024 0-4  0 - 4 /hpf Final    RBC, UA 10/18/2024 0-5  0 - 5 /hpf Final    Epithelial Cells, UA 10/18/2024 Few  Few /lpf Final    Mucus, UA 10/18/2024 Trace  /lpf Final    Ventricular Rate 10/18/2024 92  BPM Final    Atrial Rate 10/18/2024 92  BPM Final    P-R Interval 10/18/2024 152  ms Final    QRS Duration 10/18/2024 88  ms Final    Q-T Interval 10/18/2024 362  ms Final    QTc Calculation (Bazett) 10/18/2024 447  ms Final    P Axis 10/18/2024 54  degrees Final    R Axis 10/18/2024 67  degrees Final    T Axis 10/18/2024 -16  degrees Final    Diagnosis 10/18/2024    Final                    Value:Normal sinus rhythm  T wave abnormality, consider inferior ischemia  Abnormal ECG  No previous ECGs available  Confirmed by SIMRAN BAILEY [facility] ([id number]) on 10/18/2024 10:07:53 AM       No results found.                DISPOSITION:    Left AMA. They will return to the ED if problems worsen or return.  Discharge risk assessment has been performed.               FOLLOW-UP CARE:    Activity as tolerated  Regular diet             PROGNOSIS:   Guarded---- based on nature of patient's pathology/ies and treatment compliance issues.  Prognosis is greatly dependent upon patient's ability follow discharge recommendations, take medications as described, and follow up with scheduled appointments.             DISCHARGE MEDICATIONS:     Informed consent given for the use of following psychotropic medications:     Medication List      You have not been prescribed any medications.                A coordinated, multidisplinary treatment team meeting was conducted with Gaurav Garcia---here at [facility]. This team consists of the nurse and .

## 2024-10-22 NOTE — PROGRESS NOTES
Psychiatric Progress Note      Patient: Gaurav Garcia MRN: [id number]  SSN: [id number]    YOB: 1970  Age: 54 y.o.  Sex: male      Admit Date: 10/18/2024       Subjective:     Gaurav Garcia stated that he is feeling better with his mood.  Denied any suicidal or homicidal ideations.  Not interested in medications.  Patient wanted to leave AMA.  He stated he wanted to go and plan to follow up with rehab by himself.  Reported good sleep and appetite.  Not interested in medications.    Objective:     Vitals:    10/18/24 1828 10/18/24 1900 10/20/24 0607 10/20/24 1607   BP:  106/69 97/63 134/84   Pulse:  77 72 79   Resp:   16 16   Temp:   98 °F (36.7 °C) 97.7 °F (36.5 °C)   TempSrc:   Temporal Temporal   SpO2:   96% 97%   Weight: 97.1 kg (214 lb)      Height: 1.854 m (6' 1\")           Mental Status Exam:     Appearance-fairly groomed  Alert, oriented x4  Speech -normal rate, volume and rhythm  Mood-\"fine\"  Affect-congruent  Thought process-linear and goal directed  Thought content-no delusions   Thought perception-no auditory or visual hallucinations   No suicidal ideations   Insight fair  Judgement Limited    MEDICATIONS:  Current Facility-Administered Medications   Medication Dose Route Frequency    acetaminophen (TYLENOL) tablet 650 mg  650 mg Oral Q4H PRN    polyethylene glycol (GLYCOLAX) packet 17 g  17 g Oral Daily PRN    hydrOXYzine HCl (ATARAX) tablet 50 mg  50 mg Oral TID PRN    haloperidol (HALDOL) tablet 5 mg  5 mg Oral Q4H PRN    Or    haloperidol lactate (HALDOL) injection 5 mg  5 mg IntraMUSCular Q4H PRN    diphenhydrAMINE (BENADRYL) injection 50 mg  50 mg IntraMUSCular Q4H PRN    traZODone (DESYREL) tablet 50 mg  50 mg Oral Nightly PRN        DISCUSSION:  Discussed risk and benefits of medication, provided an opportunity to answer any questions, reviewed discharge goals.    Lab/Data Review:  No results found for this or any previous visit (from the past 24 hour(s)).          Assessment:

## 2024-10-22 NOTE — BH NOTE
ADMISSION NOTE    Pt. Arrived on the unit at approx: 1545, escorted by Security and ED staff via Via wheelchair.      From [facility] ER.       Pt. Awake.      Admission Type:        Reason for admission:          Addictive Behavior:          Medical Problems:   Past Medical History:   Diagnosis Date    Schizo affective schizophrenia (HCC)          Psych History:  Pt reports hx of schizophrenia, ptsd, depression, and bipolar.      Patient is not, a smoker.   If so, Nicotine patch ordered? N/a     Patient does not drink Alcohol.      Patient does, use Recreational substances or Street Drugs.      Status EXAM:  Mental Status and Behavioral Exam  Normal: No  Level of Assistance: Independent/Self  Facial Expression: Elevated  Affect: Incongruent  Level of Consciousness: Alert  Frequency of Checks: 4 times per hour, close  Mood:Normal: Yes  Motor Activity:Normal: Yes  Eye Contact: Good  Observed Behavior: Friendly, Impulsive  Sexual Misconduct History: Current - no  Preception: Jerome to person  Attention:Normal: Yes  Thought Processes: Tangential  Thought Content:Normal: Yes  Depression Symptoms: Change in energy level, Increased irritability  Anxiety Symptoms: No problems reported or observed.  Patricia Symptoms: Labile  Hallucinations: None  Delusions: No  Memory:Normal: Yes  Insight and Judgment: No  Insight and Judgment: Poor judgment    Pt admitted with followings belongings:  Dental Appliances: None  Vision - Corrective Lenses: None  Hearing Aid: None  Jewelry: None  Body Piercings Removed: N/A  Clothing: Footwear, Jacket/Coat, Shirt, Pants, Belt  Other Valuables: Wallet, Other (Comment) (ID Cards)     Valuables placed in safe in security envelope, number:  no security envelope. Patient belongings, locked in locker on unit. Valuables left with Patient at bedside none.  Patient's did not have home medications.      Patient oriented to surroundings and program expectations and copy of patient rights given.   
 Pt slept overnight ,remained sleeping as of this time.       no violent no self harming behavior noticed or reported.  
B:   Patient alert and oriented x 4.   Pt. States depression is 0.  Pt. States Anxiety is 0.  Pt. denies Hallucinations.  Pt. denies Delusions.  Pt. denies SI.  Pt. denies HI.   Pt. cooperative with Assessment.  Pt.'s behavior Cooperative and Pleasant.   Pt has been refusing medications and groups.     I:    If patient is disoriented, reorient pt.  Build trust with patient, by therapeutic listening and Groups.  Encourage pt. To attend and Participate in Groups.  Provide Medications as ordered and needed.  Encourage pt. To be up for all meals and snacks, and consume all of each. Encourage pt. To interact with staff and peers in a positive manner.  Encourage pt. To keep good hygiene.  Q 15 minute safety checks.    R:   Pt. did not attend and Participate in Group.  Pt. Is Compliant with Medications    pt is not taking any medications .   Pt. is getting up for meals and snacks.  Pt. Consumes 100% of Meals.  Pt. is, interacting with Peers.   Pt.'s hygiene is Good.  Pt. does not, have any safety issues.    P:   Pt. Will verbalize one person he can count on for support when going through a mental health crisis before discharge.  Pt. Will continue to comply with Plan of Care toward Discharge.  Pt. Will continue to stay safe on the unit. Pt is requesting to leave AMA today.   
B:   Patient alert and oriented x 4.   Pt. States depression is 0.  Pt. States Anxiety is 0.  Pt. denies Hallucinations.  Pt. denies Delusions.  Pt. denies SI.  Pt. denies HI.   Pt. cooperative with Assessment.  Pt.'s behavior Cooperative. Pt reports he had a real bad break up and was feeling depressed and feeling SI. Pt states, \"I don't feel depressed or suicidal anymore. I am ready to go to rehab. I smoke crack everyday. I don't want any medications for depression. I just want to go a substance abuse facility. There is this one called [facility] for 28 days.\"      I:    If patient is disoriented, reorient pt.  Build trust with patient, by therapeutic listening and Groups.  Encourage pt. To attend and Participate in Groups.  Provide Medications as ordered and needed.  Encourage pt. To be up for all meals and snacks, and consume all of each. Encourage pt. To interact with staff and peers in a positive manner.  Encourage pt. To keep good hygiene.  Q 15 minute safety checks.    R:   Pt. did attend and Participate in Group.  Pt. Is Compliant with Medications , pt is not taking any medications.   Pt. is getting up for meals and snacks.  Pt. Consumes 100% of Meals.  Pt. is, interacting with Peers.   Pt.'s hygiene is Good.  Pt. does not, have any safety issues.    P:   Pt. Will stay focused on getting help at rehab for the next three days.  Pt. Will continue to comply with Plan of Care toward Discharge.  Pt. Will continue to stay safe on the unit.   
B:   Patient alert and oriented x 4.   Pt. States depression is 0.  Pt. States Anxiety is 0.  Pt. denies Hallucinations.  Pt. denies Delusions.  Pt. denies SI.  Pt. denies HI.   Pt. cooperative with Assessment.  Pt.'s behavior demanding and manipulative.   Pt refuses to attend any groups for the day. Pt is very demanding and manipulative towards staff. He is focused on food rather than his care. Pt states, \"I am ready to leave. I want a partial hospitalization program in [place]. I just want something that will get me out of here the fastest. They don't even give me snacks when I want them. When people are trying to heal their mind, their stomach needs to be full. Like Dennis he fed people then taught them.\" Provider explained that we have certain rules the patients must abide by, pt calmed down and verbalized understanding.     I:    If patient is disoriented, reorient pt.  Build trust with patient, by therapeutic listening and Groups.  Encourage pt. To attend and Participate in Groups.  Provide Medications as ordered and needed.  Encourage pt. To be up for all meals and snacks, and consume all of each. Encourage pt. To interact with staff and peers in a positive manner.  Encourage pt. To keep good hygiene.  Q 15 minute safety checks.    R:   Pt. did not attend or Participate in Group.  Pt. Is Compliant with Medications    pt is not receiving any medications .   Pt. is getting up for meals and snacks.  Pt. Consumes 100% of Meals.  Pt. is not, interacting with Peers.   Pt.'s hygiene is Poor.  Pt. does not, have any safety issues.    P:   Pt. Will develop positive Coping skills to help manage his mental health for the next two days.  Pt. Will continue to comply with Plan of Care toward Discharge.  Pt. Will continue to stay safe on the unit. Possible discharge tomorrow.   
B:   Patient alert and oriented x 4.   Pt. States depression is 0/10.  Pt. States Anxiety is 0/10.  Pt. denies Hallucinations.  Pt. denies Delusions.  Pt. denies SI.  Pt. denies HI.   Pt. cooperative with Assessment.  Pt.'s behavior Cooperative.     Pt has been talking on the phone tonight and has had no issues with staff thus far.  I:    If patient is disoriented, reorient pt.  Build trust with patient, by therapeutic listening and Groups.  Encourage pt. To attend and Participate in Groups.  Provide Medications as ordered and needed.  Encourage pt. To be up for all meals and snacks, and consume all of each. Encourage pt. To interact with staff and peers in a positive manner.  Encourage pt. To keep good hygiene.  Q 15 minute safety checks.    R:   Pt. did attend and Participate in Group.  Pt. Is Compliant with Medications   No Meds No meds .   Pt. is getting up for meals and snacks.  Pt. Consumes 100% of Meals.  Pt. is, interacting with Peers.   Pt.'s hygiene is Good.  Pt. does not, have any safety issues.    P:   Pt. Will develop and continue to utilize positive Coping skills.  Pt. Will continue to comply with Plan of Care toward Discharge.  Pt. Will continue to stay safe on the unit.   
BEHAVIORAL HEALTH GROUP NOTE FOR NON ATTENDEES        DATE: 10/21/2024      GROUP START: 1000    GROUP END: 1045          GROUP TYPE: Community Meeting        ATTENDANCE: Refused to participate          SIGNATURE:  Mary Kay Henriquez                    
DISCHARGE SUMMARY    NAME:Gaurav Garcia  : 1970  MRN: [id number]    The patient Gaurav Garcia exhibits the ability to control behavior in a less restrictive environment.  Patient's level of functioning is improving.  No assaultive/destructive behavior has been observed for the past 24 hours.  No suicidal/homicidal threat or behavior has been observed for the past 24 hours.  There is no evidence of serious medication side effects.  Patient has not been in physical or protective restraints for at least the past 24 hours.    If weapons involved, how are they secured? N/A    Is patient aware of and in agreement with discharge plan? Yes    Arrangements for medication:  Prescriptions On file    Copy of discharge instructions to provider?:  Yes    Arrangements for transportation home:  Pt will be taken home via Medicaid cab.     Keep all follow up appointments as scheduled, continue to take prescribed medications per physician instructions.  Mental health crisis number:  911 or your local mental health crisis line number at [phone]      Mental Health Emergency WARM LINE      [phone] [phone])      M-F: 9am to 9pm      Sat & Sun: 5pm - 9pm  National suicide prevention lines:                             6-315-LOMHXNY (5-532-829-0804)       2-632-571-TALK (1-467.428.4412)    Crisis Text Line:  Text HOME to 546661  
PSYCHOSOCIAL ASSESSMENT  :Patient identifying info:   Gaurav Garcia is a 54 y.o., male admitted 10/18/2024  3:30 PM   Pt reports goal of getting into [facility].    Presenting problem and precipitating factors: Pt was admitted voluntarily due to SI with a plan to shoot himself in the head. Pt reports that he relapsed on cocaine. Pt reports feeling hopeless and has hx of depression.Pt has hx of substance use disorder.    Mental status assessment: Pt alert and oriented x4. Pt denies current SI, HI, AVH. Pt has fair insight stating that he recognizes that he needs rehab to get clean and sober.     Strengths/Recreation/Coping Skills:Pt reports that he enjoys journaling and meditation.     Collateral information:  No collateral given at this time.     Current psychiatric /substance abuse providers and contact info: None at this time.    Previous psychiatric/substance abuse providers and response to treatment: Pt reports being on Wellbutrin in the past. Pt denies previous hospitalizations.     Family history of mental illness or substance abuse: None reported.     Substance abuse history:    Social History     Tobacco Use    Smoking status: Never    Smokeless tobacco: Never   Substance Use Topics    Alcohol use: Never       History of biomedical complications associated with substance abuse: Pt reports hx of cravings.     Patient's current acceptance of treatment or motivation for change: Pt was able to sign treatment plan and states \"Stay clean. Work on depression.\"     Family constellation: Pt raised by biological parents. Pt does not have children.    Is significant other involved? No    Describe support system: Pt could not identify significant support.    Describe living arrangements and home environment: Pt was living on his own. Pt had recent break up. Pt interested in sober living.     GUARDIAN/POA: No    Guardian Name: N/A    Guardian Contact: N/A    Health issues:     Trauma history: None 
PT. LEFT AMA. PT. WAS ESCORTED TO THE FRONT OF THE HOSPITAL PER NURSE ARACELIS CAMARA. PT. AMBULATED OUT TO THE TAXI WITHOUT STUMBLING. PT. VOICED NO THOUGHTS OF WANTING TO HARM SELF OR  OTHERS.   
Provider in to see the patient via skype. Staff has explained the increased risk for relapse if pt leaves AMA. Pt verbalizes understanding and is not willing to wait for rehab facilities to do assessments and go [id number].     Pt states, \"I am going to get the cab to take me directly to the rehab facility in [place]. They take walk-ins. Maybe I can get in there sooner than having to sit here and wait.\"   
Pt come & got his sandwich and juice pt asked for something else and I told that was it pt in in his room and got his roommate so that he could get his snack.  I told that he could not do that.Pt got up 0345 and asks for some food pt to me he feels like he is in longterm pt told me to google his name. Because I would  not give him some food.  
Pt received in his bed sleeping.         Pt   didn't attended  wrap up group,  but joined others and ate snack ( Juice and sandwich), pt then left came to nursing station window complaining about snack and that he want more food  he went back to day room and returned back to his room , it seem he wake up his peer in room 103[address]2 whom usually doesn't eat snack, and peer noticed walking after Gaurav to day room talking to staff, pt informed by staff that he ate his snack when he returned form day room and was unable to get peer snack, . Pt unsatisfied and asking for grievance form. informed about snack rules and tomorrw he will be given the [employer] day time to fill for his food prefrences        Upon assessment  pt  Alert and oriented  x 4, Pt denies SI/HI, denies A/V hallucinations, denies pain .remained preoccupied about food describing that he he Ihas isuuses to deal with but find self dealing  ith 15 cent  sandwich value.     No scheduled HS medication prescribed.      Pt noticed being sleeping  by 2130       Remained sleeping as of this time.       no violent no self harming behavior noticed or reported.  
Pt received in his bed sleeping.         Pt  attended  wrap up group for short time . Filled group paper and rated d depression and anxiety as 1  in group paper. Pt ate  his HS snack.        Upon assessment  pt  Alert and oriented  x 4, Pt denies SI/HI, denies A/V hallucinations, denies pain .     No scheduled HS medication prescribed.      Pt noticed being sleeping  [id number]       Remained sleeping as of this time.       no violent no self harming behavior noticed or reported.  
Pt received in his bed sleeping.         Pt  attended  wrap up group for short time . Filled group paper and rated d depression and anxiety as 1  in group paper. Pt ate  his HS snack.       Upon assessment  pt  Alert and oriented  x 4, Pt denies SI/HI, denies A/V hallucinations, denies pain .     No scheduled HS medication prescribed.      Pt noticed being sleeping  by 2130       Remained sleeping as of this time.       no violent no self harming behavior noticed or reported.  
Pt refused vital sign check.   
Pt refused vital signs and labs this morning. Pt reports he will be compliant with the lab work tomorrow morning.   
Pt states he does not want to go bed to bed for [facility]. Pt states he wants to stay with family and not wait for assessment for door to door. Pt wishes to discharge AMA.    Pt provided address to family:    [address] [place] [address]    [employer] cab: [phone]    Trip ID: [id number]  
Pt. Belongings given back to pt., verified all there, signed for. Discharge instructions explained to pt. Including, Follow up appt. With [facility] .      Medications called into none. Pt was not taking any medications.  Pt. Denies SI/HI at time of discharge.     Discharge Paperwork and H & P, faxed to [facility], With success sheet.     Pt. Displayed no safety concerns at discharge.      Pt. Escorted to front by staff for transportation by medicaid cab, to home.   
TREATMENT TEAM COMPLETED     Attending meeting was as follows:  Patient, Winterkel Silver, Therapist, Writer, and Dr. Shafer.       Meeting was conducted :       In Person  no      Skype   yes         Daily Treatment Team consists of the following:       Pt. Cognitive status:  alert and oriented x 4, pleasant and cooperative, good eye contact, well groomed, and concentration/judgement good    Pt. Attending Groups: Yes    Pt. Participating in groups:  Yes    Pt. Homicidal / Suicidal: normal    Pt. Behaviors:  Cooperative    Pt. Compliant with Medications:  pt is not taking anuy medications and is not interested in taking any medications.           New Medication orders:  No      Discharge Plan:  Rehab    Pt denies SI/HI, A/V hallucinations, and anxiety and depression. Pt refuses to go to any of the groups. Pt states, \"I want to get into a [facility] or a rehab facility in [place]. I don't want to leave today. I want to get things in order before I leave. I want to get treatment.\" Clinicals have been sent to several facilities in [place]. Pt aware and in understanding and agreement.     
Writer contacted [facility].Confirmed PHP program with housing.     Ms. Olea- left message  
Residential and get substance abuse services set up.     Advanced Directive:   Does the patient have an appointed surrogate decision maker? No  Does the patient have a Medical Advance Directive? No    Does the patient have a Psychiatric Advance Directive? No  If the patient does not have a surrogate or Medical Advance Directive AND Psychiatric Advance Directive, the patient was offered information on these advance directives Patient declined to complete    Smoking:       Pt. is not a smoker.    If pt. Is a smoker, was smoking cessation education provided at discharge? N/A    If pt. Is a smoker, were they referred to smoking cessation appointment? N/A     Refused?    N/A     If pt. Is a smoker, were they offered smoking cessation medication at discharge? N/A     Refused?    N/A          Substance Abuse/ Alcohol:    Pt. is a drinker / substance abuser.    If pt. Is a drinker, was Alcohol Education Provided at discharge? yes    If pt. Is a drinker / Substance abuser, were they referred to treatment?  yes        Refused?    yes    If pt. Is a drinker / Substance abuser, was medication offered for cessation at discharge? yes       Refused?     yes          Patient Instructions: Please continue all medications until otherwise directed by physician.         Patient Transition Record Discussed with Patient and copy given to patient, with pt return verbalization of understanding?   Yes            Patient discharged to Home      If pt. Was NOT discharged home and was discharged to another facility, were the following discussed with other facility?     Our 24-hour/7 day content information, including physician for emergencies related to inpatient stay? N/A    2.    The Contact information for Pending studies, which is the [phone]?  N/A    3.    Pt's plan for follow up, as noted above in follow up?  N/A    4.    Pt's primary Physician, other healthcare professional, or site designated for follow-up care?

## 2024-10-30 ENCOUNTER — HOSPITAL ENCOUNTER (EMERGENCY)
Facility: HOSPITAL | Age: 54
Discharge: HOME | End: 2024-10-31
Attending: STUDENT IN AN ORGANIZED HEALTH CARE EDUCATION/TRAINING PROGRAM
Payer: MEDICAID

## 2024-10-30 DIAGNOSIS — F14.10 COCAINE USE DISORDER (HCC): Primary | ICD-10-CM

## 2024-10-30 PROCEDURE — 99285 EMERGENCY DEPT VISIT HI MDM: CPT

## 2024-10-30 PROCEDURE — 90791 PSYCH DIAGNOSTIC EVALUATION: CPT

## 2024-10-30 ASSESSMENT — PAIN DESCRIPTION - LOCATION: LOCATION: ARM

## 2024-10-30 ASSESSMENT — PAIN DESCRIPTION - ORIENTATION: ORIENTATION: LEFT

## 2024-10-30 ASSESSMENT — LIFESTYLE VARIABLES
HOW OFTEN DO YOU HAVE A DRINK CONTAINING ALCOHOL: NEVER
HOW MANY STANDARD DRINKS CONTAINING ALCOHOL DO YOU HAVE ON A TYPICAL DAY: PATIENT DOES NOT DRINK

## 2024-10-30 ASSESSMENT — PAIN SCALES - GENERAL: PAINLEVEL_OUTOF10: 10

## 2024-10-30 ASSESSMENT — PAIN - FUNCTIONAL ASSESSMENT: PAIN_FUNCTIONAL_ASSESSMENT: 0-10

## 2024-10-30 ASSESSMENT — PAIN DESCRIPTION - DESCRIPTORS: DESCRIPTORS: ACHING

## 2024-10-31 ENCOUNTER — APPOINTMENT (OUTPATIENT)
Facility: HOSPITAL | Age: 54
End: 2024-10-31
Payer: MEDICAID

## 2024-10-31 VITALS
WEIGHT: 220 LBS | DIASTOLIC BLOOD PRESSURE: 82 MMHG | HEART RATE: 91 BPM | BODY MASS INDEX: 29.16 KG/M2 | TEMPERATURE: 98.7 F | RESPIRATION RATE: 18 BRPM | OXYGEN SATURATION: 95 % | SYSTOLIC BLOOD PRESSURE: 125 MMHG | HEIGHT: 73 IN

## 2024-10-31 LAB
ALBUMIN SERPL-MCNC: 3.2 G/DL (ref 3.5–5)
ALBUMIN/GLOB SERPL: 1.1 (ref 1.1–2.2)
ALP SERPL-CCNC: 62 U/L (ref 45–117)
ALT SERPL-CCNC: 20 U/L (ref 12–78)
ANION GAP SERPL CALC-SCNC: 9 MMOL/L (ref 2–12)
AST SERPL-CCNC: 15 U/L (ref 15–37)
BASOPHILS # BLD: 0.1 K/UL (ref 0–0.1)
BASOPHILS NFR BLD: 1 % (ref 0–1)
BILIRUB SERPL-MCNC: 0.9 MG/DL (ref 0.2–1)
BUN SERPL-MCNC: 7 MG/DL (ref 6–20)
BUN/CREAT SERPL: 6 (ref 12–20)
CALCIUM SERPL-MCNC: 8.3 MG/DL (ref 8.5–10.1)
CHLORIDE SERPL-SCNC: 104 MMOL/L (ref 97–108)
CO2 SERPL-SCNC: 28 MMOL/L (ref 21–32)
CREAT SERPL-MCNC: 1.12 MG/DL (ref 0.7–1.3)
DIFFERENTIAL METHOD BLD: NORMAL
EKG ATRIAL RATE: 81 BPM
EKG DIAGNOSIS: NORMAL
EKG P AXIS: 67 DEGREES
EKG P-R INTERVAL: 156 MS
EKG Q-T INTERVAL: 406 MS
EKG QRS DURATION: 86 MS
EKG QTC CALCULATION (BAZETT): 471 MS
EKG R AXIS: 40 DEGREES
EKG T AXIS: 68 DEGREES
EKG VENTRICULAR RATE: 81 BPM
EOSINOPHIL # BLD: 0.1 K/UL (ref 0–0.4)
EOSINOPHIL NFR BLD: 2 % (ref 0–7)
ERYTHROCYTE [DISTWIDTH] IN BLOOD BY AUTOMATED COUNT: 13.4 % (ref 11.5–14.5)
ETHANOL SERPL-MCNC: <10 MG/DL (ref 0–0.08)
GLOBULIN SER CALC-MCNC: 2.9 G/DL (ref 2–4)
GLUCOSE SERPL-MCNC: 111 MG/DL (ref 65–100)
HCT VFR BLD AUTO: 38.4 % (ref 36.6–50.3)
HGB BLD-MCNC: 12.4 G/DL (ref 12.1–17)
IMM GRANULOCYTES # BLD AUTO: 0 K/UL (ref 0–0.04)
IMM GRANULOCYTES NFR BLD AUTO: 0 % (ref 0–0.5)
LYMPHOCYTES # BLD: 2.1 K/UL (ref 0.8–3.5)
LYMPHOCYTES NFR BLD: 38 % (ref 12–49)
MCH RBC QN AUTO: 28.3 PG (ref 26–34)
MCHC RBC AUTO-ENTMCNC: 32.3 G/DL (ref 30–36.5)
MCV RBC AUTO: 87.7 FL (ref 80–99)
MONOCYTES # BLD: 0.7 K/UL (ref 0–1)
MONOCYTES NFR BLD: 13 % (ref 5–13)
NEUTS SEG # BLD: 2.5 K/UL (ref 1.8–8)
NEUTS SEG NFR BLD: 46 % (ref 32–75)
NRBC # BLD: 0 K/UL (ref 0–0.01)
NRBC BLD-RTO: 0 PER 100 WBC
PLATELET # BLD AUTO: 256 K/UL (ref 150–400)
PMV BLD AUTO: 9.6 FL (ref 8.9–12.9)
POTASSIUM SERPL-SCNC: 3.5 MMOL/L (ref 3.5–5.1)
PROT SERPL-MCNC: 6.1 G/DL (ref 6.4–8.2)
RBC # BLD AUTO: 4.38 M/UL (ref 4.1–5.7)
SODIUM SERPL-SCNC: 141 MMOL/L (ref 136–145)
WBC # BLD AUTO: 5.4 K/UL (ref 4.1–11.1)

## 2024-10-31 PROCEDURE — 80053 COMPREHEN METABOLIC PANEL: CPT

## 2024-10-31 PROCEDURE — 93010 ELECTROCARDIOGRAM REPORT: CPT | Performed by: SPECIALIST

## 2024-10-31 PROCEDURE — 73030 X-RAY EXAM OF SHOULDER: CPT

## 2024-10-31 PROCEDURE — 82077 ASSAY SPEC XCP UR&BREATH IA: CPT

## 2024-10-31 PROCEDURE — 85025 COMPLETE CBC W/AUTO DIFF WBC: CPT

## 2024-10-31 PROCEDURE — 36415 COLL VENOUS BLD VENIPUNCTURE: CPT

## 2024-10-31 PROCEDURE — 93005 ELECTROCARDIOGRAM TRACING: CPT | Performed by: STUDENT IN AN ORGANIZED HEALTH CARE EDUCATION/TRAINING PROGRAM
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
## 2024-10-31 NOTE — BSMART NOTE
BSMART assessment completed, and suicide risk level noted to be low risk, CSSR moderate risk. Primary Nurse Dipika and Charge Nurse Merly and Physician Joseph notified. Concerns not observed.
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
## 2024-10-31 NOTE — ED NOTES
This RN provided patient with several resources given by BSMART. Pt can go to the outpatient program at 8 am that he is already scheduled for at 12. Pt laughing at this RN stating \"how are you going to discharge me when I just said I was suicidal.\" Explained to pt he did not meet criteria the two times [facility] came to see him. Pt laughing in room stating \"wow. My life is going to kill me I make bad decisions.\"

## 2024-10-31 NOTE — ED NOTES
Pt ambulated to restroom and requested something to eat and drink afterwards. Pt reported he would speak to BSMART. [facility] has been called.

## 2024-10-31 NOTE — ED NOTES
Inez from BSMART spoke with patient. Patient reports no SI/HI/AH/VH. Pt reports having an appointment tomorrow with [facility]. [facility]ART recommendation to discharge. Joseph JARAMILLO aware.
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
## 2024-10-31 NOTE — DISCHARGE INSTRUCTIONS
Substance Abuse and Addiction Resources    [facility]  [phone]  Closed meeting- family members that have been affected bysomeone alcohol abuse  Open meeting everyone can attend.    [facility] [phone]  Non professional (alcoholics in recovery helping others)  Hold Meetings (talk about sobriety, have desire)  No charge    [facility] [phone]  Florentin Torres is the Treatment Consultant in the [facility]  Free one-on-one phone consultation and insurance verification  12 step based meetings and philosophy  Family programs and sessions    [facility] [phone]  Free individual assessment  Intensive outpatient outpatient program  Ambulatory withdrawal management/detoxification  Insurance required    [facility] [phone] or [phone]  For patients who are homeless, getting ready to be homeless or with no insurance  Sliding fee scale available for self pay  Patients go Monday-Friday from 8-4:30 to central intake for a shelter and then to [facility] for services  Offers a variety of services I.e. Laundry, shower, eye clinic, medical clinic, dental, substance abuse services, case management, etc.    [facility] [phone]  Make appointment with counselor  $60 fee for initial hour intake    [facility] [phone]    [facility] [phone]  Offers Detox and Inpatient Treatment for men only. Social detox  Is a homeless shelter with longterm 12 step program. Can choose just access to the hshelter component  Must be able to walk <1miles one way to attend classes, be highly motivated and willing to complete all 12 steps.  8 months- 1 year is the typical length of stay if accepted    [facility] [phone]  For residents of [facility]  They offer outpatient treatment and can make referrals for inpatient careBased on income.   Will bill insurance. Accept Medicaid.  They have a walk in clinic that patients can go to  (Medicaid/sliding scale), Proof of income (any)    [facility] [phone]  Walk in then Assessment by licensed professional   East office ([address]) Monday, Tuesday, Thursday  9AM to 3PM.   West office ([address] [place] [address], [address]) Monday-Thursday 9AM - 3PM.     [facility]  [phone]  Walk In Monday to Friday starting at 8AM  Bring Picture ID, Proof of residence (piece of mail), Proof of insurance (Medicaid/sliding scale)  At 9AM is the Substance Abuse Services Orientation Group and then scheduled for Intake Evaluation.

## 2024-10-31 NOTE — BSMART NOTE
This writer attempted to assess patient and he was sleeping. Patient looked at writer moaned when this writer introduced herself and then closed his eyes sleeping again. As writer continued to try assessment he continued to sleep. ED provider notified and patient will be reassessed later.

## 2024-10-31 NOTE — ED NOTES
[facility]. Will attempt to discharge at a later time. Joseph DO made aware.
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
## 2025-05-05 ENCOUNTER — OFFICE VISIT (OUTPATIENT)
Age: 55
End: 2025-05-05

## 2025-05-05 VITALS
HEART RATE: 84 BPM | WEIGHT: 261 LBS | RESPIRATION RATE: 16 BRPM | TEMPERATURE: 98.3 F | OXYGEN SATURATION: 96 % | DIASTOLIC BLOOD PRESSURE: 89 MMHG | BODY MASS INDEX: 34.43 KG/M2 | SYSTOLIC BLOOD PRESSURE: 142 MMHG

## 2025-05-05 DIAGNOSIS — S89.91XA RIGHT KNEE INJURY, INITIAL ENCOUNTER: ICD-10-CM

## 2025-05-05 DIAGNOSIS — G44.209 TENSION HEADACHE: ICD-10-CM

## 2025-05-05 DIAGNOSIS — M54.50 ACUTE BILATERAL LOW BACK PAIN WITHOUT SCIATICA: ICD-10-CM

## 2025-05-05 DIAGNOSIS — S46.002A INJURY OF LEFT ROTATOR CUFF, INITIAL ENCOUNTER: Primary | ICD-10-CM
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
## 2025-05-05 NOTE — PATIENT INSTRUCTIONS
Thank you for visiting [facility] today.    If you develop a fever, gross inability to walk/weakness, severe numbness in bilateral lower extremities, saddle paresthesias, and/or loss of bladder/bowel control, please go to the nearest emergency department for concern of cauda equina.    -Alternate between ice and heat.  Heat may be applied for 30 minutes at a time every 4-5 hours.  Take warm epsom salt baths and gentle stretches.  -Ibuprofen 600 mg every 6 hours as needed and Tylenol 500 mg every 6 hours as needed for pain control. Best if used in combination  -Increase water hydration. Please drink at least 64 ounces of fluid a day   -Avoid lifting heavy objects.  -Physical Therapy referral sent  -Exercises once pain has been controlled    Please follow up with your primary care provider within 2-3 days if  your signs and symptoms have not resolved or worsened.    Please go immediately to the Emergency Department if you develop any severely worsening headache, blurred vision, dizziness, chest pain/shortness of breath, difficulty staying awake, loss of bowel or bladder function, peripheral numbness/weakness/tingling, shortness of breath, chest pain, and significant fevers above 100.4F.